# Patient Record
Sex: MALE | Race: WHITE
[De-identification: names, ages, dates, MRNs, and addresses within clinical notes are randomized per-mention and may not be internally consistent; named-entity substitution may affect disease eponyms.]

---

## 2018-01-03 ENCOUNTER — HOSPITAL ENCOUNTER (EMERGENCY)
Dept: HOSPITAL 33 - ED | Age: 58
Discharge: HOME | End: 2018-01-03
Payer: MEDICARE

## 2018-01-03 VITALS — SYSTOLIC BLOOD PRESSURE: 144 MMHG | HEART RATE: 68 BPM | DIASTOLIC BLOOD PRESSURE: 98 MMHG

## 2018-01-03 VITALS — OXYGEN SATURATION: 96 %

## 2018-01-03 DIAGNOSIS — K21.9: ICD-10-CM

## 2018-01-03 DIAGNOSIS — E11.9: ICD-10-CM

## 2018-01-03 DIAGNOSIS — Z72.0: ICD-10-CM

## 2018-01-03 DIAGNOSIS — Z79.899: ICD-10-CM

## 2018-01-03 DIAGNOSIS — J44.9: ICD-10-CM

## 2018-01-03 DIAGNOSIS — Z79.4: ICD-10-CM

## 2018-01-03 DIAGNOSIS — I10: Primary | ICD-10-CM

## 2018-01-03 PROCEDURE — 99281 EMR DPT VST MAYX REQ PHY/QHP: CPT

## 2018-01-03 NOTE — ERPHSYRPT
- History of Present Illness


Time Seen by Provider: 01/03/18 17:21


Source: patient


Exam Limitations: no limitations


Patient Subjective Stated Complaint: pt states he was advised to come to Er 

from pain clinic due to hypertension.


Triage Nursing Assessment: pt pink, warm, dry. pt ambulated into Er withoput 

difficulty. pt afebrile.


Physician History: 





The patient is a 57-year-old male who was sent over from the pain clinic where 

his blood pressure was elevated at 220/130.  He has no complaints except for a 

slight headache.  He states that his diet habit was poor over the holidays 

where he was eating a lot more salt on his food.  He believes he missed some of 

his hypertensive medicines the last 2 days.  He denies nausea or vomiting.  He 

denies visual changes.  He denies numbness or tingling.  His past medical 

history is significant for hypertension, COPD, diabetes.


Timing/Duration: today


Severity: moderate


Modifying Factors: Improves With: nothing


Associated Symptoms: denies symptoms


Allergies/Adverse Reactions: 








No Known Drug Allergies Allergy (Verified 01/03/18 17:19)


 





Home Medications: 








Esomeprazole Magnesium [Nexium] 40 mg PO DAILY 09/11/13 [History]


Hydrocodone/Acetaminophen [Vicodin Hp  mg Tablet] 1 each PO UD 09/11/13 [

History]


Paroxetine HCl 20 mg*** [Paxil 20 MG***] 30 mg PO DAILY 09/11/13 [History]


Rosuvastatin Calcium [Crestor] 5 mg PO DAILY 09/11/13 [History]


Fluticasone/Salmeterol [Advair 250-50 Diskus] 1 ea IH DAILY 09/29/16 [History]


Glyburide 2.5 mg PO DAILY 09/29/16 [History]


Levothyroxine Sodium [Levoxyl] 25 mcg PO DAILY 09/29/16 [History]


Lisinopril/Hydrochlorothiazide [Lisinopril-Hctz 20-12.5 mg Tab] 1 ea PO DAILY 09 /29/16 [History]


Metformin HCl 500 mg*** [Glucophage 500 MG***] 250 mg PO DAILY 09/29/16 [History

]


Testosterone Cypionate 200 mg IM UD 09/29/16 [History]


Armodafinil [Nuvigil] 150 mg PO DAILY 10/27/16 [History]


Cholecalciferol (Vitamin D3) [Vitamin D3] 1,000 unit PO DAILY 10/27/16 [History]


Ropinirole HCl [Requip] 1 mg PO TID 10/27/16 [History]


Gabapentin 300 - 600 mg PO TID 07/13/17 [History]


Nebivolol HCl [Bystolic] 20 mg PO DAILY 07/13/17 [History]





Hx Tetanus, Diphtheria Vaccination/Date Given: Yes (up unknown)


Hx Influenza Vaccination/Date Given: Yes


Hx Pneumococcal Vaccination/Date Given: No


Immunizations Up to Date: Yes





- Review of Systems


Constitutional: No Fever, No Chills


Eyes: No Symptoms


Ears, Nose, & Throat: No Symptoms


Respiratory: No Cough, No Dyspnea


Cardiac: No Chest Pain, No Edema, No Syncope


Abdominal/Gastrointestinal: No Abdominal Pain, No Nausea, No Vomiting, No 

Diarrhea


Genitourinary Symptoms: No Dysuria


Musculoskeletal: No Back Pain, No Neck Pain


Skin: No Rash


Neurological: No Dizziness, No Focal Weakness, No Sensory Changes


Psychological: No Symptoms


Endocrine: No Symptoms


Hematologic/Lymphatic: No Symptoms


Immunological/Allergic: No Symptoms


All Other Systems: Reviewed and Negative





- Past Medical History


Pertinent Past Medical History: Yes


Neurological History: No Pertinent History


ENT History: No Pertinent History


Cardiac History: No Pertinent History, Hypertension


Respiratory History: COPD


Endocrine Medical History: Diabetes Type II


GI Medical History: GERD


 History: No Pertinent History


Psycho-Social History: No Pertinent History


Male Reproductive Disorders: No Pertinent History





- Past Surgical History


Past Surgical History: Yes


Neuro Surgical History: No Pertinent History


Cardiac: No Pertinent History


Respiratory: No Pertinent History


Gastrointestinal: No Pertinent History


Genitourinary: No Pertinent History


Musculoskeletal: No Pertinent History


Male Surgical History: No Pertinent History


Other Surgical History: SLEEP APENA AND SINUS SURG





- Social History


Smoking Status: Current every day smoker


How long have you smoked: 30


Exposure to second hand smoke: No


Drug Use: none


Patient Lives Alone: No





- Nursing Vital Signs


Nursing Vital Signs: 


 Initial Vital Signs











Temperature  97.8 F   01/03/18 17:13


 


Pulse Rate  75   01/03/18 17:13


 


Respiratory Rate  18   01/03/18 17:13


 


Blood Pressure  162/102   01/03/18 17:13


 


O2 Sat by Pulse Oximetry  96   01/03/18 17:13








 Pain Scale











Pain Intensity                 0

















- Physical Exam


General Appearance: no apparent distress, alert


Eye Exam: PERRL/EOMI, eyes nml inspection


Ears, Nose, Throat Exam: normal ENT inspection, TMs normal, pharynx normal, 

moist mucous membranes


Neck Exam: normal inspection, non-tender, supple, full range of motion


Respiratory Exam: normal breath sounds, lungs clear, No respiratory distress


Cardiovascular Exam: regular rate/rhythm, normal heart sounds, normal 

peripheral pulses


Gastrointestinal/Abdomen Exam: soft, normal bowel sounds, No tenderness, No mass


Rectal Exam: not done


Back Exam: normal inspection, normal range of motion, No CVA tenderness, No 

vertebral tenderness


Extremity Exam: normal inspection, normal range of motion, pelvis stable


Neurologic Exam: alert, oriented x 3, cooperative, normal mood/affect, nml 

cerebellar function, nml station & gait, sensation nml, No motor deficits


Skin Exam: normal color, warm, dry, No rash


Lymphatic Exam: No adenopathy


**SpO2 Interpretation**: normal


SpO2: 96


Oxygen Delivery: Room Air





- Progress


Progress: improved


Progress Note: 





01/03/18 18:01


BP was 155/98.


Counseled pt/family regarding: diagnosis





- Departure


Time of Disposition: 18:01


Departure Disposition: Home


Clinical Impression: 


 Elevated blood pressure reading





Condition: Stable


Critical Care Time: No


Referrals: 


EULALIO BOJORQUEZ [ACTIVE STAFF] - 


Additional Instructions: 


You have a mildly elevated blood pressure today.  You have known hypertension 

and take medicines to control your hypertension.  When you return home this 

evening, please take your blood pressure medicines this evening as usual.  

Monitor your blood pressure.  If her blood pressure is not well controlled in 

the next 1-2 days, please either return to the ER or see your primary medical 

doctor.

## 2021-06-20 ENCOUNTER — HOSPITAL ENCOUNTER (EMERGENCY)
Dept: HOSPITAL 33 - ED | Age: 61
Discharge: HOME | End: 2021-06-20
Payer: MEDICARE

## 2021-06-20 VITALS — DIASTOLIC BLOOD PRESSURE: 109 MMHG | HEART RATE: 76 BPM | SYSTOLIC BLOOD PRESSURE: 157 MMHG

## 2021-06-20 VITALS — OXYGEN SATURATION: 98 %

## 2021-06-20 DIAGNOSIS — K04.7: Primary | ICD-10-CM

## 2021-06-20 PROCEDURE — 99284 EMERGENCY DEPT VISIT MOD MDM: CPT

## 2021-06-20 PROCEDURE — 96372 THER/PROPH/DIAG INJ SC/IM: CPT

## 2021-06-20 NOTE — ERPHSYRPT
- History of Present Illness


Time Seen by Provider: 06/20/21 03:02


Source: patient


Exam Limitations: no limitations


Patient Subjective Stated Complaint: "My tooth hurts."


Triage Nursing Assessment: Patient reported upper front tooth pain. he reported 

his front tooth has been loose for the past  week. He reported biting something 

that really made it hurt. Does not see a dentist. Has had tooth infections in 

the past. Pain radiates to the face.  Gums erythematous/edematous. Poor 

dentition with multiple sites of decay.


Physician History: 





60 years old male with multiple medical problems presented in the ER with chief 

complaint of right upper anterior jaw pain with loose incisor on the right for 

the last 2 to 3 days.  Patient has been using over-the-counter pain medication 

with partial relief and earlier bite on something which aggravated the pain.  

Pain is moderate to severe sharp shooting, continuous with associated minimal 

swelling of gingiva.  Denies fever or chills.


Timing/Duration: days (2)


Severity: severe


ENT Location: dental


Prearrival Treatment: over the counter meds


Associated Symptoms: facial pain/swelling, jaw pain, tooth pain


Allergies/Adverse Reactions: 








No Known Drug Allergies Allergy (Verified 06/20/21 02:35)


   





Home Medications: 








Paroxetine HCl 20 mg*** [Paxil 20 MG***] 30 mg PO DAILY 09/11/13 [History]


Rosuvastatin Calcium [Crestor] 5 mg PO DAILY 09/11/13 [History]


Fluticasone/Salmeterol [Advair 250-50 Diskus] 1 ea IH DAILY 09/29/16 [History]


Lisinopril/Hydrochlorothiazide [Lisinopril-Hctz 20-12.5 mg Tab] 1 ea PO DAILY 

09/29/16 [History]


Metformin HCl 500 mg*** [Glucophage 500 MG***] 250 mg PO DAILY 09/29/16 

[History]


Cholecalciferol (Vitamin D3) [Vitamin D3] 1,000 unit PO DAILY 10/27/16 [History]


Gabapentin 300 mg PO DAILY 07/13/17 [History]


Nebivolol HCl [Bystolic] 20 mg PO DAILY 07/13/17 [History]





Hx Tetanus, Diphtheria Vaccination/Date Given: No (up unknown)


Hx Influenza Vaccination/Date Given: Yes


Hx Pneumococcal Vaccination/Date Given: No





Travel Risk





- International Travel


Have you traveled outside of the country in past 3 weeks: No





- Coronavirus Screening


Are you exhibiting any of the following symptoms?: No


Close contact with a COVID-19 positive Pt in past 14-21 Days: No





- Vaccine Status


Have you recieved a Covid-19 vaccination: Yes


: Unknown





- Vaccination Dates


Dates if Unknown: n/a





- Review of Systems


Constitutional: No Symptoms


Eyes: No Symptoms


Ears, Nose, & Throat: No Symptoms


Respiratory: No Symptoms


Cardiac: No Symptoms


Abdominal/Gastrointestinal: No Symptoms


Genitourinary Symptoms: No Symptoms


Skin: No Symptoms


Psychological: No Symptoms


Endocrine: No Symptoms





- Past Medical History


Pertinent Past Medical History: Yes


Neurological History: No Pertinent History


ENT History: No Pertinent History


Cardiac History: No Pertinent History, High Cholesterol, Hypertension


Respiratory History: COPD


Endocrine Medical History: Diabetes Type II


GI Medical History: GERD


 History: No Pertinent History


Psycho-Social History: Depression


Male Reproductive Disorders: No Pertinent History





- Past Surgical History


Past Surgical History: Yes


Neuro Surgical History: No Pertinent History


Cardiac: No Pertinent History


Respiratory: No Pertinent History


Gastrointestinal: No Pertinent History


Genitourinary: No Pertinent History


Musculoskeletal: No Pertinent History


Male Surgical History: No Pertinent History


Other Surgical History: SLEEP APENA AND SINUS SURG





- Social History


Smoking Status: Current every day smoker


How long have you smoked: 30


Exposure to second hand smoke: No


Drug Use: none


Patient Lives Alone: No





- Nursing Vital Signs


Nursing Vital Signs: 


                               Initial Vital Signs











Temperature  98.6 F   06/20/21 02:31


 


Pulse Rate  94 H  06/20/21 02:31


 


Respiratory Rate  18   06/20/21 02:31


 


Blood Pressure  179/113   06/20/21 02:31


 


O2 Sat by Pulse Oximetry  98   06/20/21 02:31








                                   Pain Scale











Pain Intensity                 4

















- Physical Exam


General Appearance: no apparent distress, alert, anxiety


Eye Exam: bilateral eye: normal inspection, PERRL, EOMI


Ear Exam: bilateral ear: auricle normal


Nasal Exam: normal inspection


Throat Exam: normal, dental tenderness (Loose right incisor with adjacent 

gingival swelling)


Neck Exam: normal inspection, non-tender, supple, full range of motion


Cardiovascular/Respiratory Exam: normal breath sounds, regular rate/rhythm


Neurologic Exam: alert, oriented x 3, cooperative


Skin Exam: normal color


**SpO2 Interpretation**: normal


SpO2: 98


O2 Delivery: Room Air


Ordered Tests: 


Medication Summary














Discontinued Medications














Generic Name Dose Route Start Last Admin





  Trade Name Freq  PRN Reason Stop Dose Admin


 


Amoxicillin/Clavulanate Potassium  875 mg  06/20/21 03:02  06/20/21 03:10





  Augmentin 875-125 Tablet***  PO  06/20/21 03:03  875 mg





  STAT ONE   Administration


 


Amoxicillin/Clavulanate Potassium  Confirm  06/20/21 03:07 





  Augmentin 875-125 Tablet***  Administered  06/20/21 03:08 





  Dose  





  875 mg  





  .ROUTE  





  .STK-MED ONE  


 


Morphine Sulfate  4 mg  06/20/21 03:03  06/20/21 03:09





  Morphine Sulfate 4 Mg Inj***  IM  06/20/21 03:04  4 mg





  STAT ONE   Administration


 


Morphine Sulfate  Confirm  06/20/21 03:07 





  Morphine Sulfate 4 Mg Inj***  Administered  06/20/21 03:08 





  Dose  





  4 mg  





  .ROUTE  





  .STK-MED ONE  














- Progress


Progress Note: 





06/20/21 03:35


Patient has right anterior incisor loose with associated gingival swelling and 

tenderness.  I have offered him localized nerve block but patient did not 

tolerate, given symptomatic treatment and started on Augmentin, recommended 

outpatient dentist follow-up.


Counseled pt/family regarding: diagnosis, need for follow-up





- Departure


Departure Disposition: Home


Clinical Impression: 


 Dental infection





Condition: Stable


Critical Care Time: No


Referrals: 


DOCTOR,NO FAMILY [Primary Care Provider] - 


CRISTINA GARNICA DDS [NON-STAFF PHY W/O PRIVILEGES] -  (call in 1 day for 

reevaluation)


Instructions:  Dental Pain (DC)


Additional Instructions: 


Follow-up with dentist for reevaluation.  Return to ER for increasing swelling 

or if develop fever chills etc.  Take pain medications as needed


Prescriptions: 


Amoxicillin/Potassium Clav [Augmentin 875-125 Tablet] 875 mg PO BID 7 Days #14 

tablet


Hydrocodone/Acetaminophen [Hydrocodone-Acetamin 7.5-325] 1 each PO Q4-6HPRN PRN 

2 Days #8 tablet MDD 5


 PRN Reason: Pain